# Patient Record
Sex: MALE | Race: WHITE | NOT HISPANIC OR LATINO | Employment: FULL TIME | URBAN - METROPOLITAN AREA
[De-identification: names, ages, dates, MRNs, and addresses within clinical notes are randomized per-mention and may not be internally consistent; named-entity substitution may affect disease eponyms.]

---

## 2023-10-08 PROBLEM — N40.1 BPH WITH OBSTRUCTION/LOWER URINARY TRACT SYMPTOMS: Status: ACTIVE | Noted: 2023-10-08

## 2023-10-08 PROBLEM — R97.20 ELEVATED PSA: Status: ACTIVE | Noted: 2023-10-08

## 2023-10-08 PROBLEM — N20.0 NEPHROLITHIASIS: Status: ACTIVE | Noted: 2023-10-08

## 2023-10-08 PROBLEM — N13.8 BPH WITH OBSTRUCTION/LOWER URINARY TRACT SYMPTOMS: Status: ACTIVE | Noted: 2023-10-08

## 2023-10-08 PROBLEM — R35.0 URINARY FREQUENCY: Status: ACTIVE | Noted: 2023-10-08

## 2023-10-08 PROBLEM — R31.0 GROSS HEMATURIA: Status: ACTIVE | Noted: 2023-10-08

## 2023-10-08 PROBLEM — N52.9 MALE ERECTILE DISORDER: Status: ACTIVE | Noted: 2023-10-08

## 2023-10-08 PROBLEM — N28.9 KIDNEY LESION: Status: ACTIVE | Noted: 2023-10-08

## 2023-10-08 RX ORDER — INDOMETHACIN 50 MG/1
50 CAPSULE ORAL 3 TIMES DAILY PRN
COMMUNITY
Start: 2020-12-28

## 2023-10-08 RX ORDER — TADALAFIL 20 MG/1
TABLET ORAL
COMMUNITY
Start: 2021-11-29 | End: 2023-11-29 | Stop reason: SDUPTHER

## 2023-10-08 RX ORDER — OLMESARTAN MEDOXOMIL 20 MG/1
TABLET ORAL
COMMUNITY

## 2023-10-08 RX ORDER — ASPIRIN 81 MG/1
TABLET ORAL
COMMUNITY

## 2023-10-08 RX ORDER — ROSUVASTATIN CALCIUM 40 MG/1
TABLET, COATED ORAL
COMMUNITY

## 2023-10-08 RX ORDER — OLMESARTAN MEDOXOMIL 40 MG/1
1 TABLET ORAL DAILY
COMMUNITY
Start: 2020-12-28

## 2023-10-08 RX ORDER — TAMSULOSIN HYDROCHLORIDE 0.4 MG/1
1 CAPSULE ORAL NIGHTLY
COMMUNITY
Start: 2020-12-18 | End: 2023-11-29 | Stop reason: SDUPTHER

## 2023-10-08 RX ORDER — ALLOPURINOL 300 MG/1
TABLET ORAL
COMMUNITY

## 2023-10-08 RX ORDER — ROSUVASTATIN CALCIUM 20 MG/1
1 TABLET, COATED ORAL DAILY
COMMUNITY
Start: 2020-12-28

## 2023-10-08 RX ORDER — RIZATRIPTAN BENZOATE 5 MG/1
TABLET, ORALLY DISINTEGRATING ORAL
COMMUNITY
Start: 2021-10-04

## 2023-10-10 ENCOUNTER — APPOINTMENT (OUTPATIENT)
Dept: UROLOGY | Facility: CLINIC | Age: 63
End: 2023-10-10
Payer: COMMERCIAL

## 2023-10-24 DIAGNOSIS — R97.20 ELEVATED PSA: ICD-10-CM

## 2023-10-24 DIAGNOSIS — N20.0 NEPHROLITHIASIS: ICD-10-CM

## 2023-11-10 ENCOUNTER — ANCILLARY PROCEDURE (OUTPATIENT)
Dept: RADIOLOGY | Facility: CLINIC | Age: 63
End: 2023-11-10
Payer: COMMERCIAL

## 2023-11-10 DIAGNOSIS — N20.0 NEPHROLITHIASIS: ICD-10-CM

## 2023-11-10 PROCEDURE — 76770 US EXAM ABDO BACK WALL COMP: CPT

## 2023-11-10 PROCEDURE — 76770 US EXAM ABDO BACK WALL COMP: CPT | Performed by: STUDENT IN AN ORGANIZED HEALTH CARE EDUCATION/TRAINING PROGRAM

## 2023-11-22 ENCOUNTER — LAB (OUTPATIENT)
Dept: LAB | Facility: LAB | Age: 63
End: 2023-11-22
Payer: COMMERCIAL

## 2023-11-22 DIAGNOSIS — R97.20 ELEVATED PSA: ICD-10-CM

## 2023-11-22 LAB — PSA SERPL-MCNC: 0.64 NG/ML

## 2023-11-22 PROCEDURE — 36415 COLL VENOUS BLD VENIPUNCTURE: CPT

## 2023-11-22 PROCEDURE — 84153 ASSAY OF PSA TOTAL: CPT

## 2023-11-29 ENCOUNTER — OFFICE VISIT (OUTPATIENT)
Dept: UROLOGY | Facility: CLINIC | Age: 63
End: 2023-11-29
Payer: COMMERCIAL

## 2023-11-29 DIAGNOSIS — N40.1 BPH WITH OBSTRUCTION/LOWER URINARY TRACT SYMPTOMS: Primary | ICD-10-CM

## 2023-11-29 DIAGNOSIS — N50.811 PAIN IN RIGHT TESTICLE: ICD-10-CM

## 2023-11-29 DIAGNOSIS — N52.9 MALE ERECTILE DISORDER: ICD-10-CM

## 2023-11-29 DIAGNOSIS — R97.20 ELEVATED PSA: ICD-10-CM

## 2023-11-29 DIAGNOSIS — N13.8 BPH WITH OBSTRUCTION/LOWER URINARY TRACT SYMPTOMS: Primary | ICD-10-CM

## 2023-11-29 PROCEDURE — 51798 US URINE CAPACITY MEASURE: CPT | Performed by: UROLOGY

## 2023-11-29 PROCEDURE — 1036F TOBACCO NON-USER: CPT | Performed by: UROLOGY

## 2023-11-29 PROCEDURE — 99214 OFFICE O/P EST MOD 30 MIN: CPT | Performed by: UROLOGY

## 2023-11-29 PROCEDURE — 51741 ELECTRO-UROFLOWMETRY FIRST: CPT | Performed by: UROLOGY

## 2023-11-29 RX ORDER — TAMSULOSIN HYDROCHLORIDE 0.4 MG/1
0.4 CAPSULE ORAL NIGHTLY
Qty: 90 CAPSULE | Refills: 3 | Status: SHIPPED | OUTPATIENT
Start: 2023-11-29 | End: 2024-11-28

## 2023-11-29 RX ORDER — TADALAFIL 20 MG/1
20 TABLET ORAL DAILY PRN
Qty: 30 TABLET | Refills: 6 | Status: SHIPPED | OUTPATIENT
Start: 2023-11-29 | End: 2024-11-28

## 2023-11-29 NOTE — PROGRESS NOTES
Subjective   Doug Velasquez is a 63 y.o. presenting today for an annual visit. Patient has history of elevated PSA with PI-RADS 2 on MRI 02/01/2021, gross hematuria with negative workup 02/2021, BPH with LUTS, on Tamsulosin 0.4 mg, nephrolithiasis, renal lesion, and ED, on Cialis 20 mg. Patients urinary symptoms are controlled with Tamsulosin. He reports occasional nocturia x2. His nocturia is worse when he has a significant fluid intake prior to bedtime. Patient has new complaint of right inguinal pain that radiates to right testicle.He denies flank pain, gross hematuria, kidney stones, and recurrent UTI.    Objective   Past Medical History:   Diagnosis Date    Personal history of other diseases of the circulatory system     History of hypertension     Past Surgical History:   Procedure Laterality Date    OTHER SURGICAL HISTORY  12/07/2020    Shoulder surgery     Current Outpatient Medications on File Prior to Visit   Medication Sig Dispense Refill    allopurinol (Zyloprim) 300 mg tablet Allopurinol TABS   Refills: 0       Active      aspirin 81 mg EC tablet Aspirin 81 MG TABS   Refills: 0       Active      indomethacin (Indocin) 50 mg capsule Take 1 capsule (50 mg) by mouth 3 times a day as needed (gout flare).      olmesartan (Benicar) 20 mg tablet Benicar 20 MG Oral Tablet   Refills: 0       Active      rizatriptan MLT (Maxalt-MLT) 5 mg disintegrating tablet Rizatriptan Benzoate 5 MG Oral Tablet Disintegrating   Quantity: 9  Refills: 0        Start : 4-Oct-2021   Active      rosuvastatin (Crestor) 20 mg tablet Take 1 tablet (20 mg) by mouth once daily.      [DISCONTINUED] tadalafil 20 mg tablet TAKE 1 TABLET DAILY 1 HOUR BEFORE NEEDED      [DISCONTINUED] tamsulosin (Flomax) 0.4 mg 24 hr capsule Take 1 capsule (0.4 mg) by mouth once daily at bedtime.      olmesartan (BENIcar) 40 mg tablet Take 1 tablet (40 mg) by mouth once daily.      rosuvastatin (Crestor) 40 mg tablet Crestor 40 MG Oral Tablet   Refills: 0        Active       No current facility-administered medications on file prior to visit.     No Known Allergies    There were no vitals taken for this visit.  Physical Exam    Lab Review    Lab Results   Component Value Date    PSA 0.64 11/22/2023     PVR 10 ml  Uroflow  study demonstrated voided volume of 322 and maximal flow rate of 5 ml/s.    Assessment/Plan   Diagnoses and all orders for this visit:  BPH with obstruction/lower urinary tract symptoms  -     tamsulosin (Flomax) 0.4 mg 24 hr capsule; Take 1 capsule (0.4 mg) by mouth once daily at bedtime.  -     Urine flow measurement  Elevated PSA  -     Measure post void residual  -     Prostate Specific Antigen; Future  Pain in right testicle  -     US scrotum; Future  Male erectile disorder  -     tadalafil 20 mg tablet; Take 1 tablet (20 mg) by mouth once daily as needed for erectile dysfunction. TAKE 1 TABLET DAILY 1 HOUR BEFORE NEEDED    BPH with LUTS     Patient symptoms are well controlled on Tamsulosin. We will refill.       2. ED     Good response to Cialis 20mg. We will refill this.     3. History of elevated PSA     Patients last PSA was 0.64 on 11/22/2023, stable.     Will continue to monitor with annual PSA checks.    4. Right testicular pain      We will obtain a scrotal US for further evaluation and follow up virtually to review.     All questions were answered to the patient's satisfaction. Patient agrees with the plan and wishes to proceed. Follow-up will be scheduled appropriately.     Scribed for Dr. Aranda by Della Amos. I , Dr Aranda, have personally reviewed and agreed with the information entered by the Virtual Scribe.

## 2023-12-05 ENCOUNTER — ANCILLARY PROCEDURE (OUTPATIENT)
Dept: RADIOLOGY | Facility: CLINIC | Age: 63
End: 2023-12-05
Payer: COMMERCIAL

## 2023-12-05 DIAGNOSIS — N50.811 PAIN IN RIGHT TESTICLE: ICD-10-CM

## 2023-12-05 PROCEDURE — 76870 US EXAM SCROTUM: CPT | Performed by: RADIOLOGY

## 2023-12-05 PROCEDURE — 76870 US EXAM SCROTUM: CPT

## 2023-12-13 ENCOUNTER — APPOINTMENT (OUTPATIENT)
Dept: UROLOGY | Facility: CLINIC | Age: 63
End: 2023-12-13
Payer: COMMERCIAL

## 2024-02-05 ENCOUNTER — HOSPITAL ENCOUNTER (OUTPATIENT)
Dept: ORTHOPEDIC SURGERY | Age: 64
Discharge: HOME | End: 2024-02-07
Payer: COMMERCIAL

## 2024-02-05 ENCOUNTER — OFFICE VISIT (OUTPATIENT)
Dept: ORTHOPEDIC SURGERY | Age: 64
End: 2024-02-05
Payer: COMMERCIAL

## 2024-02-05 VITALS
HEIGHT: 72 IN | OXYGEN SATURATION: 97 % | BODY MASS INDEX: 31.97 KG/M2 | WEIGHT: 236 LBS | HEART RATE: 80 BPM | TEMPERATURE: 97.1 F

## 2024-02-05 DIAGNOSIS — M54.50 LOW BACK PAIN, UNSPECIFIED BACK PAIN LATERALITY, UNSPECIFIED CHRONICITY, UNSPECIFIED WHETHER SCIATICA PRESENT: ICD-10-CM

## 2024-02-05 DIAGNOSIS — M48.062 SPINAL STENOSIS OF LUMBAR REGION WITH NEUROGENIC CLAUDICATION: Primary | ICD-10-CM

## 2024-02-05 PROCEDURE — 72110 X-RAY EXAM L-2 SPINE 4/>VWS: CPT | Performed by: ORTHOPAEDIC SURGERY

## 2024-02-05 PROCEDURE — 72110 X-RAY EXAM L-2 SPINE 4/>VWS: CPT

## 2024-02-05 PROCEDURE — 99204 OFFICE O/P NEW MOD 45 MIN: CPT | Performed by: ORTHOPAEDIC SURGERY

## 2024-02-05 RX ORDER — OLMESARTAN MEDOXOMIL 40 MG/1
1 TABLET ORAL DAILY
COMMUNITY
Start: 2024-01-02

## 2024-02-05 RX ORDER — ALLOPURINOL 100 MG/1
100 TABLET ORAL DAILY
COMMUNITY
Start: 2024-01-10

## 2024-02-05 RX ORDER — ROSUVASTATIN CALCIUM 20 MG/1
1 TABLET, COATED ORAL DAILY
COMMUNITY
Start: 2024-01-02

## 2024-02-05 RX ORDER — TADALAFIL 20 MG/1
TABLET ORAL
COMMUNITY
Start: 2024-01-10

## 2024-02-05 RX ORDER — RIZATRIPTAN BENZOATE 5 MG/1
TABLET, ORALLY DISINTEGRATING ORAL
COMMUNITY
Start: 2024-01-10

## 2024-02-05 RX ORDER — TAMSULOSIN HYDROCHLORIDE 0.4 MG/1
CAPSULE ORAL
COMMUNITY

## 2024-02-05 RX ORDER — INDOMETHACIN 50 MG/1
CAPSULE ORAL
COMMUNITY
Start: 2023-07-03

## 2024-02-05 RX ORDER — ASPIRIN 81 MG/1
81 TABLET ORAL DAILY
COMMUNITY

## 2024-02-05 NOTE — PROGRESS NOTES
Subjective:      Patient ID: Slick Maya is a 63 y.o. male who presents today for:  Chief Complaint   Patient presents with    New Patient     Pt presents today for a new patient apt for back and leg pain  This pain started years ago  Symptoms Include pain and weakness  Denies injury  Pain radiates to legs  The pain does disturb pts sleep.    Pain worsens with walking and standing   Pt is not taking pain medication.   PT does not see pain management.  He is a non-smoker.        Subjective/Objective/Assessment/Plan:     SUBJECTIVE -the patient is a 63-year-old male who comes in with low back pain and bilateral radicular complaints down the back of his thigh and calf extending into the feet.    OBJECTIVE - The patient can rise up on their toes and rise up on her heels.  5 out of 5 hip flexion and knee extension strength bilaterally.  Sensation intact bilaterally in the lower extremities from L2-S1.     XR LUMBAR SPINE (MIN 4 VIEWS)  4 views lumbar spine. Retrolisthesis at L3-4.  Multilevel thoracolumbar   spondylosis.  Multilevel thoracolumbar degenerative disc disease.    Degenerative scoliosis mild in nature.      ASSESSMENT -    Diagnosis Orders   1. Low back pain, unspecified back pain laterality, unspecified chronicity, unspecified whether sciatica present  XR LUMBAR SPINE (MIN 4 VIEWS)    Ambulatory referral to Pain Medicine      2. Spinal stenosis of lumbar region with neurogenic claudication            PLAN -we talked extensively today about nonoperative management to include oral anti-inflammatories as well as injection therapy.  He has L2-3, L3-4, and L4-5 varying degrees of moderate to severe central stenosis.  He has Modic endplate changes at L5-S1.  On the left and the right he has L4-5 and L5-S1 foraminal stenosis.  We have put a referral in for our pain management colleagues for injection therapy.  At some point he may need 3 level laminectomy decompression plus or minus fusion.  We may also need to

## 2024-02-05 NOTE — PATIENT INSTRUCTIONS
Physical Therapy Exercises    Abdominal Crunch - 3 sets, 10 reps. Increase or decrease as tolerated. If needed, search YouTube for explanation.      Pelvic Bridge - 3 sets, 10 reps, pause at top. Increase or decrease as tolerated. If needed, search YouTube for explanation.      Superman - 3 sets, 10 reps, pause at extension. Increase or decrease as tolerated. If needed, search YouTube for explanation.      Bird Dog - 3 sets, 10 reps, pause at extension. Increase or decrease as tolerated. If needed, search YouTube for explanation.      Side Plank - 3 sets, 10 reps, pause at extension. Increase or decrease as tolerated. If needed, search YouTube for explanation.      Dead Bug - 3 sets, 10 reps, pause at extension. Increase or decrease as tolerated. If needed, search YouTube for explanation.      Hamstring Stretch - any variation, hold 30 seconds. If needed, search YouTube for explanation.      Hip Flexor Stretch - hold 30 seconds. If needed, search YouTube for explanation.

## 2024-02-08 ENCOUNTER — TELEPHONE (OUTPATIENT)
Dept: PAIN MANAGEMENT | Age: 64
End: 2024-02-08

## 2024-02-08 NOTE — TELEPHONE ENCOUNTER
Per SM, called patient to see if he would like to come in tomorrow 02/09/2024 instead of 02/19/2024. SM listed these times,  8am, 1045am, 315pm, and 415pm. Patient did not answer so left voicemail for patient to call back if he was interested in coming in tomorrow.

## 2024-02-09 ENCOUNTER — INITIAL CONSULT (OUTPATIENT)
Dept: PAIN MANAGEMENT | Age: 64
End: 2024-02-09
Payer: COMMERCIAL

## 2024-02-09 VITALS
WEIGHT: 236 LBS | SYSTOLIC BLOOD PRESSURE: 114 MMHG | TEMPERATURE: 97.3 F | HEIGHT: 72 IN | BODY MASS INDEX: 31.97 KG/M2 | DIASTOLIC BLOOD PRESSURE: 60 MMHG

## 2024-02-09 DIAGNOSIS — M54.16 LUMBAR RADICULOPATHY: ICD-10-CM

## 2024-02-09 DIAGNOSIS — M79.605 BILATERAL LEG PAIN: ICD-10-CM

## 2024-02-09 DIAGNOSIS — M79.604 BILATERAL LEG PAIN: ICD-10-CM

## 2024-02-09 DIAGNOSIS — M48.062 SPINAL STENOSIS OF LUMBAR REGION WITH NEUROGENIC CLAUDICATION: Primary | ICD-10-CM

## 2024-02-09 PROCEDURE — 99204 OFFICE O/P NEW MOD 45 MIN: CPT | Performed by: PHYSICAL MEDICINE & REHABILITATION

## 2024-02-09 RX ORDER — GABAPENTIN 100 MG/1
100 CAPSULE ORAL EVERY EVENING
Qty: 30 CAPSULE | Refills: 0 | Status: SHIPPED | OUTPATIENT
Start: 2024-02-09 | End: 2024-03-10

## 2024-02-09 RX ORDER — MELOXICAM 7.5 MG/1
7.5 TABLET ORAL DAILY
Qty: 30 TABLET | Refills: 0 | Status: SHIPPED | OUTPATIENT
Start: 2024-02-09 | End: 2024-03-10

## 2024-02-09 RX ORDER — LIDOCAINE 40 MG/G
CREAM TOPICAL
Qty: 45 G | Refills: 1 | Status: SHIPPED | OUTPATIENT
Start: 2024-02-09

## 2024-02-09 ASSESSMENT — ENCOUNTER SYMPTOMS
SHORTNESS OF BREATH: 0
NAUSEA: 0
CONSTIPATION: 0
DIARRHEA: 0
BACK PAIN: 1

## 2024-02-09 NOTE — PROGRESS NOTES
Slick Maya  (1960)    2/9/2024    Subjective:     Slick Maya is 63 y.o. male who complains today of:    Chief Complaint   Patient presents with    New Patient    Back Pain       Slick Maya is a 63 y.o. male who presents for evaluation by request of Dr. Enrique Grceo for spinal stenosis of lumbar region with neurogenic claudication.    He has struggled with pain for over 1 year. He denies any immediately-preceding traumatic or inciting events. He has been previously evaluated by Dr. Greco whose records are reviewed below. He describes pain located in both sides of his low back with pain down both legs.  Pain is a constant ache and is currently a 2/10 and gets up to a 10/10 at its worst and goes down to a 1/10 at its best. Pain is worse with prolonged walking and standing. Pain is better with leaning forward and sitting.  Pain is located 50% on the right and 50% on the left. Pain is located 20% in the back and 80% in the legs.    He denies any numbness, tingling, weakness, bowel or bladder dysfunction, saddle anesthesia, falls, history of cancer, unexplained weight loss, persistent night pain and sweats, fever, IV drug abuse, immunocompromise, chronic prednisone or antibiotic use, or any other red flag symptoms. Mood is good, denies any suicidal or homicidal ideation. Sleep is good, awakes refreshed.    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 He wakes up at 4 am, tries to workout regularly. Enjoys spending time with family.      Allergies:  Patient has

## 2024-02-19 ENCOUNTER — TELEPHONE (OUTPATIENT)
Dept: PAIN MANAGEMENT | Age: 64
End: 2024-02-19

## 2024-02-21 ENCOUNTER — TELEPHONE (OUTPATIENT)
Dept: PAIN MANAGEMENT | Age: 64
End: 2024-02-21

## 2024-02-21 NOTE — TELEPHONE ENCOUNTER
REFERRAL # [id number]    BILAT L3-4 TFESI     AUTH FROM 2/20/24-8/18/24    OK to schedule procedure approved as above.   Please note sides/levels approved and date range.   (If applicable, sides/levels approved may differ from those ordered)    TO BE SCHEDULED WITH DR OMALLEY

## 2024-02-23 NOTE — TELEPHONE ENCOUNTER
2ND ATTEMPT: LMOM FOR PT TO CALL AND SCHEDULE WITH DR OMALLEY     REFERRAL # [id number]     BILAT L3-4 TFESI      AUTH FROM 2/20/24-8/18/24

## 2024-02-26 NOTE — TELEPHONE ENCOUNTER
SK- MELISSAAT L3-4 TFESI AUTH -- 2/20/24-8/18/24 patient is out of town and will call when he gets back into town.

## 2024-07-22 ENCOUNTER — TELEPHONE (OUTPATIENT)
Dept: PAIN MANAGEMENT | Age: 64
End: 2024-07-22

## 2024-07-22 NOTE — TELEPHONE ENCOUNTER
Received a referral to Dr. Castro for this patient. Called to get appointment set up he is already established with our practice. Can be scheduled with Dr. Castro. If interested in getting the TFESI, may need to contact insurance to see if auth needs resubmitted since we have moved procedures to [place].

## 2024-10-21 ENCOUNTER — LAB (OUTPATIENT)
Dept: LAB | Facility: LAB | Age: 64
End: 2024-10-21
Payer: COMMERCIAL

## 2024-10-21 DIAGNOSIS — R97.20 ELEVATED PSA: ICD-10-CM

## 2024-10-21 LAB — PSA SERPL-MCNC: 0.62 NG/ML

## 2024-10-21 PROCEDURE — 84153 ASSAY OF PSA TOTAL: CPT

## 2024-10-21 PROCEDURE — 36415 COLL VENOUS BLD VENIPUNCTURE: CPT

## 2024-11-20 ENCOUNTER — APPOINTMENT (OUTPATIENT)
Dept: UROLOGY | Facility: CLINIC | Age: 64
End: 2024-11-20
Payer: COMMERCIAL

## 2024-12-17 ENCOUNTER — APPOINTMENT (OUTPATIENT)
Dept: UROLOGY | Facility: CLINIC | Age: 64
End: 2024-12-17
Payer: COMMERCIAL

## 2024-12-17 VITALS — BODY MASS INDEX: 37.82 KG/M2 | HEIGHT: 72 IN | WEIGHT: 279.2 LBS | TEMPERATURE: 96.9 F

## 2024-12-17 DIAGNOSIS — N13.8 BPH WITH OBSTRUCTION/LOWER URINARY TRACT SYMPTOMS: Primary | ICD-10-CM

## 2024-12-17 DIAGNOSIS — N52.9 MALE ERECTILE DISORDER: ICD-10-CM

## 2024-12-17 DIAGNOSIS — N40.1 BPH WITH OBSTRUCTION/LOWER URINARY TRACT SYMPTOMS: Primary | ICD-10-CM

## 2024-12-17 PROCEDURE — 99214 OFFICE O/P EST MOD 30 MIN: CPT | Performed by: UROLOGY

## 2024-12-17 PROCEDURE — 51741 ELECTRO-UROFLOWMETRY FIRST: CPT | Performed by: UROLOGY

## 2024-12-17 PROCEDURE — 1036F TOBACCO NON-USER: CPT | Performed by: UROLOGY

## 2024-12-17 PROCEDURE — 51798 US URINE CAPACITY MEASURE: CPT | Performed by: UROLOGY

## 2024-12-17 PROCEDURE — 3008F BODY MASS INDEX DOCD: CPT | Performed by: UROLOGY

## 2024-12-17 RX ORDER — TADALAFIL 20 MG/1
20 TABLET ORAL DAILY PRN
Qty: 30 TABLET | Refills: 6 | Status: SHIPPED | OUTPATIENT
Start: 2024-12-17 | End: 2025-12-17

## 2024-12-17 RX ORDER — TAMSULOSIN HYDROCHLORIDE 0.4 MG/1
0.4 CAPSULE ORAL DAILY
Qty: 90 CAPSULE | Refills: 3 | Status: SHIPPED | OUTPATIENT
Start: 2024-12-17 | End: 2025-12-17

## 2024-12-17 ASSESSMENT — PAIN SCALES - GENERAL: PAINLEVEL_OUTOF10: 0-NO PAIN

## 2024-12-17 NOTE — PROGRESS NOTES
Subjective   Doug Velasquez is a 64 y.o. male presenting today for an annual visit. Patient has history of elevated PSA with PI-RADS 2 on MRI 02/01/2021, gross hematuria with negative workup 02/2021, BPH with LUTS, on Tamsulosin 0.4 mg, nephrolithiasis, renal lesion, and ED, on Cialis 20 mg. Patients urinary symptoms are controlled with Tamsulosin. He reports occasional nocturia x1-2 and occasional urinary frequency, which are not bothersome. Denies any recent gross hematuria, fevers, chills, urinary retention, intractable flank or abdominal pain, nausea or vomiting.      Past Medical History:   Diagnosis Date    Personal history of other diseases of the circulatory system     History of hypertension     Past Surgical History:   Procedure Laterality Date    OTHER SURGICAL HISTORY  12/07/2020    Shoulder surgery     Family History   Problem Relation Name Age of Onset    Other (cardiac disorder) Mother       Current Outpatient Medications   Medication Sig Dispense Refill    allopurinol (Zyloprim) 300 mg tablet Allopurinol TABS   Refills: 0       Active      aspirin 81 mg EC tablet Aspirin 81 MG TABS   Refills: 0       Active      indomethacin (Indocin) 50 mg capsule Take 1 capsule (50 mg) by mouth 3 times a day as needed (gout flare).      olmesartan (Benicar) 20 mg tablet Benicar 20 MG Oral Tablet   Refills: 0       Active      olmesartan (BENIcar) 40 mg tablet Take 1 tablet (40 mg) by mouth once daily.      rizatriptan MLT (Maxalt-MLT) 5 mg disintegrating tablet Rizatriptan Benzoate 5 MG Oral Tablet Disintegrating   Quantity: 9  Refills: 0        Start : 4-Oct-2021   Active      rosuvastatin (Crestor) 20 mg tablet Take 1 tablet (20 mg) by mouth once daily.      rosuvastatin (Crestor) 40 mg tablet Crestor 40 MG Oral Tablet   Refills: 0       Active      tadalafil 20 mg tablet Take 1 tablet (20 mg) by mouth once daily as needed for erectile dysfunction. TAKE 1 TABLET DAILY 1 HOUR BEFORE NEEDED 30 tablet 6     No current  facility-administered medications for this visit.     No Known Allergies  Social History     Socioeconomic History    Marital status:      Spouse name: Not on file    Number of children: Not on file    Years of education: Not on file    Highest education level: Not on file   Occupational History    Not on file   Tobacco Use    Smoking status: Never    Smokeless tobacco: Former     Types: Chew   Substance and Sexual Activity    Alcohol use: Not on file    Drug use: Not on file    Sexual activity: Not on file   Other Topics Concern    Not on file   Social History Narrative    Not on file     Social Drivers of Health     Financial Resource Strain: Unknown (7/23/2021)    Received from [facility]    Overall Financial Resource Strain (CARDIA)     Difficulty of Paying Living Expenses: Patient declined   Food Insecurity: Unknown (7/23/2021)    Received from [facility]    Hunger Vital Sign     Worried About Running Out of Food in the Last Year: Patient declined     Ran Out of Food in the Last Year: Patient declined   Transportation Needs: Unknown (7/23/2021)    Received from [facility]    PRAPARE - Transportation     Lack of Transportation (Medical): Patient declined     Lack of Transportation (Non-Medical): Patient declined   Physical Activity: Sufficiently Active (8/29/2023)    Received from [facility]    Exercise Vital Sign     Days of Exercise per Week: 5 days     Minutes of Exercise per Session: 40 min   Stress: Stress Concern Present (7/23/2021)    Received from [facility]    [employer] [place] [employer] - Occupational Stress Questionnaire     Feeling of Stress : Rather much   Social Connections: Unknown (7/23/2021)    Received from [facility]    Social Connection and Isolation Panel [NHANES]     Frequency of Communication with Friends and Family: Patient  "declined     Frequency of Social Gatherings with Friends and Family: Patient declined     Attends Druze Services: Patient declined     Active Member of Clubs or Organizations: Patient declined     Attends Club or Organization Meetings: Patient declined     Marital Status: Patient declined   Intimate Partner Violence: Not on file   Housing Stability: Low Risk  (2/15/2022)    [facility]    Housing Stability Vital Sign     Unable to Pay for Housing in the Last Year: No     Number of Places Lived in the Last Year: 1     Unstable Housing in the Last Year: No       Review of Systems  Pertinent items are noted in HPI.    Objective       Lab Review  No results found for: \"WBC\", \"RBC\", \"HGB\", \"HCT\", \"PLT\"   Lab Results   Component Value Date    BUN 13 10/30/2021    CREATININE 1.16 10/30/2021        Lab Results   Component Value Date    PSA 0.62 10/21/2024    PSA 0.64 11/22/2023    PSA 0.68 12/06/2022   IPSS 8 and 2  Uroflow study demonstrated voided volume of 351 and maximal flow rate of #23.6ml/s.   PVR = 87ml     Assessment/Plan   There are no diagnoses linked to this encounter.    BPH with LUTS     Patient's urinary symptoms are stable. We will refill Tamsulosin and follow up annually.     ED - good response to Cialis, will refill.     History of elevated PSA     PSA is 0.62 (10/21/2024), stable. We will recheck PSA in 1 year.     All questions were answered to the patient's satisfaction. Patient agrees with the plan and wishes to proceed. Follow-up will be scheduled appropriately.     E&M visit today is associated with current or anticipated ongoing medical care services related to a patient's single, serious condition or a complex condition.    Scribed for Dr. Aranda by Della Amos. I , Dr Aranda, have personally reviewed and agreed with the information entered by the Virtual Scribe.   "

## 2025-01-05 DIAGNOSIS — N40.1 BPH WITH OBSTRUCTION/LOWER URINARY TRACT SYMPTOMS: ICD-10-CM

## 2025-01-05 DIAGNOSIS — N52.9 MALE ERECTILE DISORDER: ICD-10-CM

## 2025-01-05 DIAGNOSIS — N13.8 BPH WITH OBSTRUCTION/LOWER URINARY TRACT SYMPTOMS: ICD-10-CM

## 2025-01-06 RX ORDER — TADALAFIL 20 MG/1
TABLET ORAL
Qty: 30 TABLET | Refills: 6 | Status: SHIPPED | OUTPATIENT
Start: 2025-01-06 | End: 2026-01-06

## 2025-01-06 RX ORDER — TAMSULOSIN HYDROCHLORIDE 0.4 MG/1
0.4 CAPSULE ORAL NIGHTLY
Qty: 90 CAPSULE | Refills: 3 | Status: SHIPPED | OUTPATIENT
Start: 2025-01-06 | End: 2026-01-06